# Patient Record
Sex: FEMALE | Race: WHITE
[De-identification: names, ages, dates, MRNs, and addresses within clinical notes are randomized per-mention and may not be internally consistent; named-entity substitution may affect disease eponyms.]

---

## 2017-03-23 ENCOUNTER — HOSPITAL ENCOUNTER (OUTPATIENT)
Dept: HOSPITAL 45 - C.LABMFLN | Age: 62
Discharge: HOME | End: 2017-03-23
Attending: FAMILY MEDICINE
Payer: COMMERCIAL

## 2017-03-23 DIAGNOSIS — E10.8: ICD-10-CM

## 2017-03-23 DIAGNOSIS — E10.649: ICD-10-CM

## 2017-03-23 DIAGNOSIS — R63.4: ICD-10-CM

## 2017-03-23 DIAGNOSIS — E10.65: ICD-10-CM

## 2017-03-23 DIAGNOSIS — E03.9: Primary | ICD-10-CM

## 2017-03-23 DIAGNOSIS — M85.80: ICD-10-CM

## 2017-03-23 DIAGNOSIS — E56.9: ICD-10-CM

## 2017-03-23 LAB
ALBUMIN/GLOB SERPL: 1.4 {RATIO} (ref 0.9–2)
ALP SERPL-CCNC: 109 U/L (ref 45–117)
ALT SERPL-CCNC: 47 U/L (ref 12–78)
ANION GAP SERPL CALC-SCNC: 6 MMOL/L (ref 3–11)
AST SERPL-CCNC: 38 U/L (ref 15–37)
BASOPHILS # BLD: 0.03 K/UL (ref 0–0.2)
BASOPHILS NFR BLD: 0.5 %
BUN SERPL-MCNC: 10 MG/DL (ref 7–18)
BUN/CREAT SERPL: 10.5 (ref 10–20)
CALCIUM SERPL-MCNC: 8.8 MG/DL (ref 8.5–10.1)
CHLORIDE SERPL-SCNC: 100 MMOL/L (ref 98–107)
CHOLEST/HDLC SERPL: 1.9 {RATIO}
CO2 SERPL-SCNC: 34 MMOL/L (ref 21–32)
COMPLETE: YES
CREAT SERPL-MCNC: 0.96 MG/DL (ref 0.6–1.2)
CREAT UR-MCNC: 60 MG/DL
EOSINOPHIL NFR BLD AUTO: 269 K/UL (ref 130–400)
EST. AVERAGE GLUCOSE BLD GHB EST-MCNC: 171 MG/DL
GLOBULIN SER-MCNC: 2.9 GM/DL (ref 2.5–4)
GLUCOSE SERPL-MCNC: 164 MG/DL (ref 70–99)
GLUCOSE UR QL: 103 MG/DL
HCT VFR BLD CALC: 42.4 % (ref 37–47)
IG%: 0.2 %
IMM GRANULOCYTES NFR BLD AUTO: 19.5 %
KETONES UR QL STRIP: 75 MG/DL
LYMPHOCYTES # BLD: 1.21 K/UL (ref 1.2–3.4)
MCH RBC QN AUTO: 34.6 PG (ref 25–34)
MCHC RBC AUTO-ENTMCNC: 34.7 G/DL (ref 32–36)
MCV RBC AUTO: 99.8 FL (ref 80–100)
MONOCYTES NFR BLD: 6.6 %
NEUTROPHILS # BLD AUTO: 2.1 %
NEUTROPHILS NFR BLD AUTO: 71.1 %
NITRITE UR QL STRIP: 73 MG/DL (ref 0–150)
PH UR: 193 MG/DL (ref 0–200)
PMV BLD AUTO: 10 FL (ref 7.4–10.4)
POTASSIUM SERPL-SCNC: 3.8 MMOL/L (ref 3.5–5.1)
RATIO: (no result) MCG/MG (ref 0–30)
RBC # BLD AUTO: 4.25 M/UL (ref 4.2–5.4)
SODIUM SERPL-SCNC: 140 MMOL/L (ref 136–145)
TSH SERPL-ACNC: 1.03 UIU/ML (ref 0.3–4.5)
VERY LOW DENSITY LIPOPROT CALC: 15 MG/DL
WBC # BLD AUTO: 6.21 K/UL (ref 4.8–10.8)

## 2017-03-28 NOTE — CODING QUERY MEDICAL NECESSITY
SUPPORTING DIAGNOSIS NEEDED





A supporting diagnosis is required for the test/procedure performed on this patient in 
order for us to be reimbursed by the patient's insurance. Please provide a supporting 
diagnosis for the following test/procedure listed below next to the test name along with 
your signature. 



*If there is no additional diagnosis for this patient that would support the following 
test/procedure please document that below next to the test/procedure.



Test(s)/Procedure(s) that require a supporting diagnosis:

DOS 3/23

* Vitamin D      DIAGNOSIS:



Provider Signature:  ______________________________  Date:  _______



Thank you  

Melia Cisse

Health Information Management

Phone:  [phone]

Fax:  [phone]



Once completed, please kindly fax back to [phone]



For questions please call [phone]

## 2017-04-12 ENCOUNTER — HOSPITAL ENCOUNTER (OUTPATIENT)
Dept: HOSPITAL 45 - C.LABMFLN | Age: 62
Discharge: HOME | End: 2017-04-12
Attending: FAMILY MEDICINE
Payer: COMMERCIAL

## 2017-04-12 DIAGNOSIS — R63.4: Primary | ICD-10-CM

## 2017-04-12 DIAGNOSIS — R11.0: ICD-10-CM

## 2017-04-12 DIAGNOSIS — F32.9: ICD-10-CM

## 2017-04-12 DIAGNOSIS — R19.7: ICD-10-CM

## 2017-04-12 DIAGNOSIS — G43.909: ICD-10-CM

## 2017-04-12 DIAGNOSIS — R53.83: ICD-10-CM

## 2017-04-12 LAB
ALBUMIN/GLOB SERPL: 1.4 {RATIO} (ref 0.9–2)
ALP SERPL-CCNC: 96 U/L (ref 45–117)
ALT SERPL-CCNC: 24 U/L (ref 12–78)
AMYLASE SERPL-CCNC: 11 U/L (ref 25–115)
ANION GAP SERPL CALC-SCNC: 5 MMOL/L (ref 3–11)
AST SERPL-CCNC: 17 U/L (ref 15–37)
BASOPHILS # BLD: 0.01 K/UL (ref 0–0.2)
BASOPHILS NFR BLD: 0.2 %
BUN SERPL-MCNC: 12 MG/DL (ref 7–18)
BUN/CREAT SERPL: 13.2 (ref 10–20)
CALCIUM SERPL-MCNC: 8.2 MG/DL (ref 8.5–10.1)
CHLORIDE SERPL-SCNC: 104 MMOL/L (ref 98–107)
CO2 SERPL-SCNC: 30 MMOL/L (ref 21–32)
COMPLETE: YES
CREAT SERPL-MCNC: 0.89 MG/DL (ref 0.6–1.2)
EOSINOPHIL NFR BLD AUTO: 184 K/UL (ref 130–400)
GLOBULIN SER-MCNC: 2.5 GM/DL (ref 2.5–4)
GLUCOSE SERPL-MCNC: 162 MG/DL (ref 70–99)
HCT VFR BLD CALC: 38.8 % (ref 37–47)
IG%: 0 %
IMM GRANULOCYTES NFR BLD AUTO: 19.7 %
LYMPHOCYTES # BLD: 0.9 K/UL (ref 1.2–3.4)
MCH RBC QN AUTO: 34.2 PG (ref 25–34)
MCHC RBC AUTO-ENTMCNC: 35.1 G/DL (ref 32–36)
MCV RBC AUTO: 97.5 FL (ref 80–100)
MONOCYTES NFR BLD: 12.7 %
NEUTROPHILS # BLD AUTO: 1.3 %
NEUTROPHILS NFR BLD AUTO: 66.1 %
PMV BLD AUTO: 10.4 FL (ref 7.4–10.4)
POTASSIUM SERPL-SCNC: 3.8 MMOL/L (ref 3.5–5.1)
RBC # BLD AUTO: 3.98 M/UL (ref 4.2–5.4)
SODIUM SERPL-SCNC: 139 MMOL/L (ref 136–145)
TSH SERPL-ACNC: 1.34 UIU/ML (ref 0.3–4.5)
WBC # BLD AUTO: 4.57 K/UL (ref 4.8–10.8)

## 2017-04-14 LAB — O&P GIARDIA AG: NOT DETECTED

## 2017-05-18 ENCOUNTER — HOSPITAL ENCOUNTER (OUTPATIENT)
Dept: HOSPITAL 45 - C.PAPS | Age: 62
Discharge: HOME | End: 2017-05-18
Attending: OBSTETRICS & GYNECOLOGY
Payer: COMMERCIAL

## 2017-05-18 DIAGNOSIS — R87.610: ICD-10-CM

## 2017-05-18 DIAGNOSIS — Z12.4: Primary | ICD-10-CM

## 2017-05-24 LAB
ANION GAP SERPL CALC-SCNC: 5 MMOL/L (ref 3–11)
BASOPHILS # BLD: 0.02 K/UL (ref 0–0.2)
BASOPHILS NFR BLD: 0.2 %
BUN SERPL-MCNC: 12 MG/DL (ref 7–18)
BUN/CREAT SERPL: 11.9 (ref 10–20)
CALCIUM SERPL-MCNC: 8.4 MG/DL (ref 8.5–10.1)
CHLORIDE SERPL-SCNC: 97 MMOL/L (ref 98–107)
CO2 SERPL-SCNC: 34 MMOL/L (ref 21–32)
COMPLETE: YES
CREAT CL PREDICTED SERPL C-G-VRATE: 46.9 ML/MIN
CREAT SERPL-MCNC: 1 MG/DL (ref 0.6–1.2)
EOSINOPHIL NFR BLD AUTO: 246 K/UL (ref 130–400)
GLUCOSE SERPL-MCNC: 296 MG/DL (ref 70–99)
HCT VFR BLD CALC: 38.3 % (ref 37–47)
IG%: 0.1 %
IMM GRANULOCYTES NFR BLD AUTO: 14.7 %
LYMPHOCYTES # BLD: 1.19 K/UL (ref 1.2–3.4)
MCH RBC QN AUTO: 34.3 PG (ref 25–34)
MCHC RBC AUTO-ENTMCNC: 33.4 G/DL (ref 32–36)
MCV RBC AUTO: 102.7 FL (ref 80–100)
MONOCYTES NFR BLD: 6.9 %
NEUTROPHILS # BLD AUTO: 1.2 %
NEUTROPHILS NFR BLD AUTO: 76.9 %
PMV BLD AUTO: 9.6 FL (ref 7.4–10.4)
POTASSIUM SERPL-SCNC: 4.2 MMOL/L (ref 3.5–5.1)
RBC # BLD AUTO: 3.73 M/UL (ref 4.2–5.4)
SODIUM SERPL-SCNC: 136 MMOL/L (ref 136–145)
WBC # BLD AUTO: 8.1 K/UL (ref 4.8–10.8)

## 2017-05-24 NOTE — PAT MEDICATION INSTRUCTIONS
Service Date


May 24, 2017.





Current Home Medication List


Atorvastatin (Lipitor), 10 MG PO QPM


Bupropion (Wellbutrin), 100 MG PO QAM


Celecoxib (CeleBREX), 200 MG PO QAM


Cholecalciferol (Vitamin D3), 5,000 UNITS PO NOON


Diazepam (Valium), 5 MG PO BID PRN for RN


Escitalopram Oxalate (Lexapro), 20 MG PO QAM


Fluticasone Propionate (Nasal) (Flonase Allergy Relief), 2 SPRAYS LIVIA PRN


Insulin Aspart (Novolog), UNITS SC AC


Lamotrigine (Lamictal), 50 MG PO QAM


Lamotrigine (Lamictal), 100 MG PO QPM


Levothyroxine Sodium (Synthroid), 88 MCG PO QAM


Lisinopril (Zestril), 10 MG PO QAM


Loratadine (Claritin), 10 MG PO QPM


Melatonin (Melatonin Maximum Strengt), 1 TAB PO HS


Peppermint Oil (Bulk) (Peppermint Oil), 1 DOSE TOP PRN


Probiotic Product (Probiotic), 1 TAB PO NOON


Sumatriptan Succinate (Imitrex), 100 MG PO PRN


Trazodone Hcl (Trazodone),  MG PO HS PRN for RN


Valacyclovir (Valtrex), 500 MG PO QPM


[Biotin], 1 TAB PO BID


[Diclofenac Gel], 1 DOSE TOP PTN


[M Grain], 1 DOSE TOP PRN





Medication Instructions


For Your Scheduled Surgery 








- Check with surgeon for instructions: 


Celecoxib (CeleBREX), 200 MG PO QAM








- Hold the following medications 24 hours prior to surgery:


[Diclofenac Gel], 1 DOSE TOP PTN


[M Grain], 1 DOSE TOP PRN


Peppermint Oil (Bulk) (Peppermint Oil), 1 DOSE TOP PRN








- Hold the following medications the morning of surgery:


[Biotin], 1 TAB PO BID


Probiotic Product (Probiotic), 1 TAB PO NOON


Lisinopril (Zestril), 10 MG PO QAM


Cholecalciferol (Vitamin D3), 5,000 UNITS PO NOON


Insulin Aspart (Novolog), UNITS SC AC








- Take the following medications the morning of surgery with a sip of water:


Sumatriptan Succinate (Imitrex), 100 MG PO PRN


Levothyroxine Sodium (Synthroid), 88 MCG PO QAM


Lamotrigine (Lamictal), 50 MG PO QAM


Fluticasone Propionate (Nasal) (Flonase Allergy Relief), 2 SPRAYS LIVIA PRN


Escitalopram Oxalate (Lexapro), 20 MG PO QAM


Diazepam (Valium), 5 MG PO BID PRN for RN


Bupropion (Wellbutrin), 100 MG PO QAM








- Take the following medications as scheduled the night before surgery:


[Biotin], 1 TAB PO BID


Valacyclovir (Valtrex), 500 MG PO QPM


Trazodone Hcl (Trazodone),  MG PO HS PRN for RN


Sumatriptan Succinate (Imitrex), 100 MG PO PRN


Loratadine (Claritin), 10 MG PO QPM


Melatonin (Melatonin Maximum Strengt), 1 TAB PO HS


Lamotrigine (Lamictal), 100 MG PO QPM


Fluticasone Propionate (Nasal) (Flonase Allergy Relief), 2 SPRAYS LIVIA PRN


Diazepam (Valium), 5 MG PO BID PRN for RN


Atorvastatin (Lipitor), 10 MG PO QPM





- Continue basal rate of insulin pump after midnight and do not bolus morning 

of surgery.








If you have any questions please call us at [phone] (Michelle Norman PA-C)

 or [phone] or [phone]

## 2017-06-07 ENCOUNTER — HOSPITAL ENCOUNTER (OUTPATIENT)
Dept: HOSPITAL 45 - C.PATHSPEC | Age: 62
Discharge: HOME | End: 2017-06-07
Attending: SURGERY
Payer: COMMERCIAL

## 2017-06-07 DIAGNOSIS — L72.0: Primary | ICD-10-CM

## 2017-06-21 ENCOUNTER — HOSPITAL ENCOUNTER (OUTPATIENT)
Dept: HOSPITAL 45 - C.ACU | Age: 62
Discharge: HOME | End: 2017-06-21
Attending: OTOLARYNGOLOGY
Payer: COMMERCIAL

## 2017-06-21 VITALS
WEIGHT: 112.22 LBS | HEIGHT: 65.98 IN | BODY MASS INDEX: 18.03 KG/M2 | HEIGHT: 65.98 IN | WEIGHT: 112.22 LBS | BODY MASS INDEX: 18.03 KG/M2

## 2017-06-21 VITALS — DIASTOLIC BLOOD PRESSURE: 68 MMHG | HEART RATE: 68 BPM | SYSTOLIC BLOOD PRESSURE: 158 MMHG | OXYGEN SATURATION: 100 %

## 2017-06-21 VITALS
HEART RATE: 60 BPM | OXYGEN SATURATION: 100 % | DIASTOLIC BLOOD PRESSURE: 70 MMHG | SYSTOLIC BLOOD PRESSURE: 139 MMHG | TEMPERATURE: 97.88 F

## 2017-06-21 VITALS — SYSTOLIC BLOOD PRESSURE: 166 MMHG | HEART RATE: 63 BPM | DIASTOLIC BLOOD PRESSURE: 77 MMHG | OXYGEN SATURATION: 100 %

## 2017-06-21 VITALS
HEART RATE: 66 BPM | OXYGEN SATURATION: 99 % | TEMPERATURE: 97.88 F | DIASTOLIC BLOOD PRESSURE: 75 MMHG | SYSTOLIC BLOOD PRESSURE: 158 MMHG

## 2017-06-21 VITALS
DIASTOLIC BLOOD PRESSURE: 81 MMHG | SYSTOLIC BLOOD PRESSURE: 178 MMHG | HEART RATE: 66 BPM | OXYGEN SATURATION: 99 % | TEMPERATURE: 98.06 F

## 2017-06-21 DIAGNOSIS — F32.9: ICD-10-CM

## 2017-06-21 DIAGNOSIS — Z98.890: ICD-10-CM

## 2017-06-21 DIAGNOSIS — E78.5: ICD-10-CM

## 2017-06-21 DIAGNOSIS — E11.9: ICD-10-CM

## 2017-06-21 DIAGNOSIS — Z91.040: ICD-10-CM

## 2017-06-21 DIAGNOSIS — J45.909: ICD-10-CM

## 2017-06-21 DIAGNOSIS — Z88.1: ICD-10-CM

## 2017-06-21 DIAGNOSIS — G47.33: ICD-10-CM

## 2017-06-21 DIAGNOSIS — Z79.899: ICD-10-CM

## 2017-06-21 DIAGNOSIS — J35.01: Primary | ICD-10-CM

## 2017-06-21 DIAGNOSIS — Z90.89: ICD-10-CM

## 2017-06-21 DIAGNOSIS — Z88.2: ICD-10-CM

## 2017-06-21 DIAGNOSIS — I10: ICD-10-CM

## 2017-06-21 NOTE — [facility] OPERATIVE REPORT
Operative Report


Operative Date


Jun 21, 2017.





Pre-Operative Diagnosis





Chronic Tonsillitis





Post-Operative Diagnosis


SAME





Procedure(s) Performed


BILATERAL TONSILLECTOMY





Surgeon


Dr. Roberto Carlos Armstrong





Assistant Surgeon(s)


None per surgeon





Estimated Blood Loss


2ML





Findings


1. 1-2+ CRYPTIC ENDOPHYTIC TONSILS WITH TONSILLITHS





Specimens





A.) Right Tonsil





B.) Left Tonsil


I attest to the content of the Intraoperative Record and any orders documented 

therein.  Any exceptions are noted below.

## 2017-06-21 NOTE — ANESTHESIOLOGY PROGRESS NOTE
Anesthesia Post Op Note


Date & Time


Jun 21, 2017 at 08:42





Vital Signs


Pain Intensity:  0





Vital Signs Past 12 Hours








  Date Time  Temp Pulse Resp B/P (MAP) Pulse Ox O2 Delivery O2 Flow Rate FiO2


 


6/21/17 08:30  65 16 164/81 97 Room Air  





      Oxymask  


 


6/21/17 08:20  70 16 160/83 98 Room Air  





      Oxymask  


 


6/21/17 08:10  72 16 160/85 100 Oxymask 3 


 


6/21/17 08:02 36.3 65 14 166/89 100 Oxymask 10 


 


6/21/17 05:49 36.7 66 16 178/81 (113) 99   











Notes


Mental Status:  alert / awake / arousable, participated in evaluation


Pt Amnestic to Procedure:  Yes


Nausea / Vomiting:  adequately controlled


Pain:  adequately controlled


Airway Patency, RR, SpO2:  stable & adequate


BP & HR:  stable & adequate


Hydration State:  stable & adequate


Anesthetic Complications:  no major complications apparent

## 2017-06-21 NOTE — HISTORY AND PHYSICAL
History & Physical


Date


2017.





Chief Complaint


CHRONIC TONSILLITIS





History of Present Illness


The patient is a 62 year old female with complaints of CHRONIC TONSILLITIS WITH 

SORE THROATS, HALITOSIS, AND TONSILLITH FORMATION.





Past Medical/Surgical History


Medical Problems:


(1) Diabetes


ANDRES, ASTHMA, DEPRESSION, ANXIETY, DYSLIPIDEMIA, HYPOTHYROIDISM, MIGRAINES, OA, 

OSTEOPENIA, RAYNAUD'S, RLS, VITAMIN D DEFICIENCY


PSH:


S/P APPY, , D&C, FINGER SURGERY, HYSTEROSCOPY





Allergies


Coded Allergies:  


     Latex (Verified  Allergy, Mild, RASH, 17)


     Cefaclor (Verified  Allergy, Unknown, RASH HIVES, 17)


     Cephalosporins (Verified  Allergy, Unknown, unknown, 17)


     Ciprofloxacin (Verified  Allergy, Unknown, GI SYMPTOMS, 17)


     Sulfa Antibiotics (Verified  Allergy, Unknown, UNKNOWN, 17)


 patient states that she has tolerated Bactrim in the past


     Amoxicillin (Unverified  Adverse Reaction, Unknown, severe diarrhea, )


     Clavulanic Acid (Unverified  Adverse Reaction, Unknown, severe diarrhea, )





Home Medications


Scheduled


Atorvastatin (Lipitor), 10 MG PO QPM


Bupropion (Wellbutrin), 100 MG PO QAM


Cholecalciferol (Vitamin D3), 5,000 UNITS PO NOON


Escitalopram Oxalate (Lexapro), 20 MG PO QAM


Fluticasone Propionate (Nasal) (Flonase Allergy Relief), 2 SPRAYS LIVIA PRN


Insulin Aspart (Novolog), UNITS SC AC


Lamotrigine (Lamictal), 50 MG PO QAM


Lamotrigine (Lamictal), 100 MG PO QPM


Levothyroxine Sodium (Synthroid), 88 MCG PO QAM


Lisinopril (Zestril), 10 MG PO QAM


Loratadine (Claritin), 10 MG PO QPM


Melatonin (Melatonin Maximum Strengt), 1 TAB PO HS


Peppermint Oil (Bulk) (Peppermint Oil), 1 DOSE TOP PRN


Probiotic Product (Probiotic), 1 TAB PO NOON


Sumatriptan Succinate (Imitrex), 100 MG PO PRN


Valacyclovir (Valtrex), 500 MG PO QPM


[Biotin], 1 TAB PO BID


[M Grain], 1 DOSE TOP PRN





Scheduled PRN


Diazepam (Valium), 5 MG PO BID PRN for RN


Trazodone Hcl (Trazodone),  MG PO HS PRN for RN





Physical Examination


Skin:  warm/dry, no rash


Eyes:  normal inspection, EOMI, sclerae normal


ENT:  + pertinent finding (1-2+ TONSILS WITH TONSILLITH IN L SUPERIOR POLE)


Head:  normocephalic, atraumatic


Neck:  supple, no adenopathy, trachea midline


Respiratory/Chest:  lungs clear, normal breath sounds, no respiratory distress


Cardiovascular:  regular rate, rhythm, no edema, no murmur


Neurologic/Psych:  no motor/sensory deficits, alert, normal reflexes, oriented 

x 3





Diagnosis


CHRONIC TONSILLITIS





Plan of Treatment


BILATERAL TONSILLECTOMY

## 2017-06-21 NOTE — DISCHARGE INSTRUCTIONS
Discharge Instructions


Date of Service


Jun 21, 2017.





Admission


Reason for Admission:  Chronic Tonsillitis





Discharge


Discharge Diagnosis / Problem:  SAME





Discharge Goals


Goal(s):  Therapeutic intervention





Activity Recommendations


Activity Limitations:  as noted below


LIGHT ACTIVITY FOR 2WEEKS; NO DRIVING WHILE ON LORTAB


.





Current Hospital Diet


Patient's current hospital diet:





Discharge Diet


Recommended Diet:  Full Liquid Diet


Diet Texture:  Mechanical Soft (ground)





Pending Studies


Studies pending at discharge:  no





Laboratory Results





Hemoglobin A1c








Test


  3/23/17


11:46 Range/Units


 


 


Estimated Average Glucose 171   mg/dl


 


Hemoglobin A1c 7.6 H 4.5-5.6  %








Lipid Panel








Test


  3/23/17


11:46 Range/Units


 


 


Triglycerides Level 73  0-150  mg/dl


 


Cholesterol Level 193  0-200  mg/dl


 


HDL Cholesterol 103   mg/dl


 


Cholesterol/HDL Ratio 1.9   


 


LDL Cholesterol, Calculated 75   mg/dl











Medical Emergencies








.


Who to Call and When:





Medical Emergencies:  If at any time you feel your situation is an emergency, 

please call 911 immediately.





.





Non-Emergent Contact


Non-Emergency issues call your:  Surgeon





.


.








"Provider Documentation" section prepared by Roberto Carlos Armstrong.








.





VTE Core Measure


Inpt VTE Proph given/why not?:  SCD's

## 2017-06-21 NOTE — OPERATIVE REPORT
DATE OF OPERATION:  06/21/2017

 

PREOPERATIVE DIAGNOSIS:  Chronic tonsillitis.  

 

POSTOPERATIVE DIAGNOSIS:  Chronic tonsillitis.

 

PROCEDURE:  Bilateral tonsillectomy.

 

SURGEON:  Dr. Armstrong.  

 

ASSISTANT:  None.

 

ANESTHESIA:  General endotracheal.

 

ESTIMATED BLOOD LOSS:  2 mL

 

FINDINGS:  Two 1-2+ cryptic endophytic tonsils bilaterally with tonsillith

formation.

 

SPECIMENS:  Left and right tonsil sent separately for permanent pathological

assessment.

 

COMPLICATIONS:  None.

 

INDICATIONS FOR THE PROCEDURE:  The patient is a 62-year-old female with a

history of chronic tonsillitis with recurrent tonsillith formation and

associated sore throats and halitosis.  She presents for the above-mentioned

procedure on an outpatient elective basis.

 

DESCRIPTION OF PROCEDURE:  After informed consent had been obtained from the

patient, the patient was wheeled to the operating room and placed on the

operating table in supine position.  Monitors were placed.  After induction

of general endotracheal anesthesia, the table was turned 90 degrees and the

patient's head and neck were gently extended.  Antibiotic ointment was

applied to lips and the mouth gag was carefully inserted, opened, and

stabilized on a roll of towels.  The palate was inspected and found to be

normal.  An Allis clamp was then used to grasp the right tonsil and the

superior pole and Bovie electrocautery was used to remove the tonsil in a

capsular plane with care to preserve the underlying mucosa and musculature of

the anterior and posterior tonsillar pillars.  The left tonsil was then

removed in a similar fashion.  The intraoperative findings were 1-2+ cryptic

endophytic tonsils bilaterally with tonsillith formation.  The tonsils were

sent separately for permanent pathological assessment.

 

The mouth gag was then released for 1 minute.  This was reopened and

hemostasis was confirmed.  An orogastric tube was placed and the stomach was

suctioned free of air and stomach contents.  2% lidocaine jelly was placed in

the bilateral tonsillar fossae for added anesthetic effect.

 

This marked the end of the case.  The patient tolerated the procedure well

and there were no apparent complications.  The patient was extubated and

transferred to recovery room in stable condition.

 

 

I attest to the content of the Intraoperative Record and any orders documented therein. Any exception
s are noted below.

## 2017-07-10 ENCOUNTER — HOSPITAL ENCOUNTER (OUTPATIENT)
Dept: HOSPITAL 45 - C.LABMFLN | Age: 62
Discharge: HOME | End: 2017-07-10
Attending: FAMILY MEDICINE
Payer: COMMERCIAL

## 2017-07-10 DIAGNOSIS — E10.69: ICD-10-CM

## 2017-07-10 DIAGNOSIS — E10.43: ICD-10-CM

## 2017-07-10 DIAGNOSIS — E03.9: ICD-10-CM

## 2017-07-10 DIAGNOSIS — E10.65: Primary | ICD-10-CM

## 2017-07-10 LAB
ALBUMIN/GLOB SERPL: 1.4 {RATIO} (ref 0.9–2)
ALP SERPL-CCNC: 123 U/L (ref 45–117)
ALT SERPL-CCNC: 53 U/L (ref 12–78)
ANION GAP SERPL CALC-SCNC: 5 MMOL/L (ref 3–11)
AST SERPL-CCNC: 33 U/L (ref 15–37)
BUN SERPL-MCNC: 10 MG/DL (ref 7–18)
BUN/CREAT SERPL: 11.7 (ref 10–20)
CALCIUM SERPL-MCNC: 9 MG/DL (ref 8.5–10.1)
CHLORIDE SERPL-SCNC: 100 MMOL/L (ref 98–107)
CHOLEST/HDLC SERPL: 2 {RATIO}
CO2 SERPL-SCNC: 35 MMOL/L (ref 21–32)
CREAT SERPL-MCNC: 0.84 MG/DL (ref 0.6–1.2)
EST. AVERAGE GLUCOSE BLD GHB EST-MCNC: 166 MG/DL
GLOBULIN SER-MCNC: 2.7 GM/DL (ref 2.5–4)
GLUCOSE SERPL-MCNC: 141 MG/DL (ref 70–99)
GLUCOSE UR QL: 83 MG/DL
KETONES UR QL STRIP: 73 MG/DL
NITRITE UR QL STRIP: 68 MG/DL (ref 0–150)
PH UR: 170 MG/DL (ref 0–200)
POTASSIUM SERPL-SCNC: 3.8 MMOL/L (ref 3.5–5.1)
SODIUM SERPL-SCNC: 140 MMOL/L (ref 136–145)
TSH SERPL-ACNC: 1.48 UIU/ML (ref 0.3–4.5)
VERY LOW DENSITY LIPOPROT CALC: 14 MG/DL

## 2017-09-06 ENCOUNTER — HOSPITAL ENCOUNTER (EMERGENCY)
Dept: HOSPITAL 45 - C.EDB | Age: 62
Discharge: HOME | End: 2017-09-06
Payer: COMMERCIAL

## 2017-09-06 VITALS
HEIGHT: 65.98 IN | HEIGHT: 65.98 IN | BODY MASS INDEX: 17.93 KG/M2 | WEIGHT: 111.55 LBS | WEIGHT: 111.55 LBS | BODY MASS INDEX: 17.93 KG/M2

## 2017-09-06 VITALS — TEMPERATURE: 97.7 F

## 2017-09-06 VITALS — DIASTOLIC BLOOD PRESSURE: 77 MMHG | SYSTOLIC BLOOD PRESSURE: 146 MMHG | OXYGEN SATURATION: 100 % | HEART RATE: 62 BPM

## 2017-09-06 DIAGNOSIS — R53.1: Primary | ICD-10-CM

## 2017-09-06 DIAGNOSIS — E11.9: ICD-10-CM

## 2017-09-06 DIAGNOSIS — F41.9: ICD-10-CM

## 2017-09-06 DIAGNOSIS — Z79.899: ICD-10-CM

## 2017-09-06 DIAGNOSIS — Z91.040: ICD-10-CM

## 2017-09-06 DIAGNOSIS — Z88.1: ICD-10-CM

## 2017-09-06 DIAGNOSIS — Z79.4: ICD-10-CM

## 2017-09-06 DIAGNOSIS — F32.9: ICD-10-CM

## 2017-09-06 DIAGNOSIS — Z88.8: ICD-10-CM

## 2017-09-06 DIAGNOSIS — Z88.2: ICD-10-CM

## 2017-09-06 DIAGNOSIS — E03.9: ICD-10-CM

## 2017-09-06 DIAGNOSIS — I45.10: ICD-10-CM

## 2017-09-06 LAB
ALP SERPL-CCNC: 132 U/L (ref 45–117)
ALT SERPL-CCNC: 43 U/L (ref 12–78)
ANION GAP SERPL CALC-SCNC: 3 MMOL/L (ref 3–11)
APPEARANCE UR: CLEAR
AST SERPL-CCNC: 27 U/L (ref 15–37)
BASOPHILS # BLD: 0.01 K/UL (ref 0–0.2)
BASOPHILS NFR BLD: 0.1 %
BILIRUB UR-MCNC: (no result) MG/DL
BUN SERPL-MCNC: 12 MG/DL (ref 7–18)
BUN/CREAT SERPL: 15.7 (ref 10–20)
CALCIUM SERPL-MCNC: 9.1 MG/DL (ref 8.5–10.1)
CHLORIDE SERPL-SCNC: 98 MMOL/L (ref 98–107)
CO2 SERPL-SCNC: 34 MMOL/L (ref 21–32)
COLOR UR: YELLOW
COMPLETE: YES
CREAT CL PREDICTED SERPL C-G-VRATE: 60.5 ML/MIN
CREAT SERPL-MCNC: 0.77 MG/DL (ref 0.6–1.2)
EOSINOPHIL NFR BLD AUTO: 228 K/UL (ref 130–400)
GLUCOSE SERPL-MCNC: 185 MG/DL (ref 70–99)
HCT VFR BLD CALC: 41.2 % (ref 37–47)
IG%: 0.1 %
IMM GRANULOCYTES NFR BLD AUTO: 17.7 %
LYMPHOCYTES # BLD: 1.23 K/UL (ref 1.2–3.4)
MANUAL MICROSCOPIC REQUIRED?: NO
MCH RBC QN AUTO: 35.1 PG (ref 25–34)
MCHC RBC AUTO-ENTMCNC: 35.2 G/DL (ref 32–36)
MCV RBC AUTO: 99.8 FL (ref 80–100)
MONOCYTES NFR BLD: 5.9 %
NEUTROPHILS # BLD AUTO: 1.4 %
NEUTROPHILS NFR BLD AUTO: 74.8 %
NITRITE UR QL STRIP: (no result)
PH UR STRIP: 8.5 [PH] (ref 4.5–7.5)
PMV BLD AUTO: 9.5 FL (ref 7.4–10.4)
POTASSIUM SERPL-SCNC: 3.8 MMOL/L (ref 3.5–5.1)
RBC # BLD AUTO: 4.13 M/UL (ref 4.2–5.4)
REVIEW REQ?: NO
SODIUM SERPL-SCNC: 135 MMOL/L (ref 136–145)
SP GR UR STRIP: 1.01 (ref 1–1.03)
TSH SERPL-ACNC: 2.54 UIU/ML (ref 0.3–4.5)
URINE BILL WITH OR WITHOUT MIC: (no result)
UROBILINOGEN UR-MCNC: (no result) MG/DL
WBC # BLD AUTO: 6.93 K/UL (ref 4.8–10.8)

## 2017-09-06 NOTE — DIAGNOSTIC IMAGING REPORT
CT SCAN OF THE BRAIN WITHOUT IV CONTRAST



CLINICAL HISTORY: Slurred speech.



COMPARISON STUDY:  No priors.



TECHNIQUE: Unenhanced axial CT scan of the brain is performed from the vertex to

the skull base.



CT DOSE: 537.48 mGy.cm



FINDINGS:



Brain parenchyma: There is mild subcortical and periventricular microangiopathic

change. There is no hemorrhage, mass effect, or evidence of acute territorial

ischemia by CT criteria. Gray-white matter is preserved. No extra-axial fluid

collection is seen.



Ventricles, sulci, cisterns: Normal in configuration.



Intracranial vasculature: There is atherosclerotic calcification of the

cavernous carotid arteries.



Calvarium: Unremarkable.



Sinuses and mastoids: The visualized paranasal sinuses are clear. The mastoid

air cells are well pneumatized.



Orbits: The bony orbits are grossly intact.





IMPRESSION: There is no hemorrhage, mass effect, or evidence of acute

territorial ischemia by CT criteria.







Electronically signed by:  Elijah Rodríguez M.D.

9/6/2017 6:31 PM



Dictated Date/Time:  9/6/2017 6:29 PM

## 2017-09-06 NOTE — EMERGENCY ROOM VISIT NOTE
History


Report prepared by Alejandro:  Heather Khan


Under the Supervision of:  Dr. Kwasi Doshi M.D.


First contact with patient:  16:59


Chief Complaint:  STROKE SYMPTOMS


Stated Complaint:  STUMBLING, WEAKNESS, TROUBLE/SLURRED SPEAKING





History of Present Illness


The patient is a 62 year old female who presents to the Emergency Room with 

complaints of constant generalized weakness beginning about a month ago. The 

patient notes she has been having trouble speaking, putting words together, 

slurring of her words, trouble thinking and remember things, stumbling over feet

, dizziness after leaning over to  things, loss of strength in her hands

, shakiness in hands, racing heart beat, and constant chills.  She denies any 

falls, vision changes, problems swallowing, fever, chest pain, shortness of 

breath, nausea, or vomiting. The patient also notes numbness in her left left 

arm that extends to her fingers that may be due to an injury she had with her 

left shoulder a couple months ago. The patient has a history of vertigo, 

diabetes, depression and anxiety. She states her diabetes is well controlled 

and that she was recently put on medication for her anxiety and depression. The 

patient's PCP is Dr. Elijah Salcido who advised her to come to the ED today. The 

patient saw Dr. Deshpande -Psychiatry today about her new depression/anxiety 

medication and her recent trouble maintaining her weight. She has a history of 

hypothyroidism and a tonsillectomy.





   Source of History:  patient, family


   Onset:  a month ago


   Position:  other (generalized)


   Quality:  other (weakness)


   Associated Symptoms:  No fevers, No chest pain, No SOB, No nausea, No 

vomiting


Note:


The patient notes trouble speaking, putting words together, slurring of her 

words, trouble thinking and remember things, stumbling over feet, dizziness 

after leaning over to  things, loss of strength in her hands, shakiness 

in hands, some racing heart beats, and constant chills. Pt  denies any falls, 

vision changes, or problems swallowing.





Review of Systems


See HPI for pertinent positives & negatives. A total of 10 systems reviewed and 

were otherwise negative.





Past Medical & Surgical


Medical Problems:


(1) Anxiety


(2) Depression


(3) Diabetes


(4) Hypothyroid


(5) Vertigo





Old medical records were reviewed. Nurse's notes were reviewed and I agree with.





Family History





Diabetes mellitus





Social History


Smoking Status:  Never Smoker


Smokeless Tobacco Use:  No


Alcohol Use:  occasionally


Drug Use:  none


Housing Status:  lives with family


Occupation Status:  employed





Current/Historical Medications


Scheduled


Atorvastatin (Lipitor), 10 MG PO QPM


Biotin (Biotin), 1 TAB PO BID


Bupropion (Wellbutrin), 100 MG PO UD


Cholecalciferol (Vitamin D3), 5,000 UNITS PO NOON


Diazepam (Valium), 5 MG PO HS


Escitalopram Oxalate (Lexapro), 20 MG PO HS


Fluticasone Propionate (Nasal) (Flonase Allergy Relief), 2 SPRAYS LIVIA PRN


Insulin Aspart (Novolog), SQ AC


Lamotrigine (Lamictal), 50 MG PO BID


Levothyroxine Sodium (Synthroid), 88 MCG PO QAM


Lisinopril (Zestril), 10 MG PO HS


Loratadine (Claritin), 10 MG PO QPM


Melatonin (Melatonin Maximum Strengt), 1 TAB PO HS


Peppermint Oil (Bulk) (Peppermint Oil), 1 DOSE TOP PRN


Sumatriptan Succinate (Imitrex), 100 MG PO PRN


Valacyclovir (Valtrex), 500 MG PO QPM


[M Grain], 1 DOSE TOP PRN





Scheduled PRN


Trazodone Hcl (Trazodone),  MG PO HS PRN for RN





Allergies


Coded Allergies:  


     Latex (Verified  Allergy, Mild, RASH, 9/6/17)


     Cefaclor (Verified  Allergy, Unknown, RASH HIVES, 9/6/17)


     Cephalosporins (Verified  Allergy, Unknown, unknown, 9/6/17)


     Sulfa Antibiotics (Verified  Allergy, Unknown, UNKNOWN, 9/6/17)


 patient states that she has tolerated Bactrim in the past


     Amoxicillin (Verified  Adverse Reaction, Unknown, severe diarrhea, 9/6/17)


     Ciprofloxacin (Verified  Adverse Reaction, Unknown, GI SYMPTOMS, 9/6/17)


     Clavulanic Acid (Verified  Adverse Reaction, Unknown, severe diarrhea, 9/6/ 17)





Physical Exam


Vital Signs











  Date Time  Temp Pulse Resp B/P (MAP) Pulse Ox O2 Delivery O2 Flow Rate FiO2


 


9/6/17 20:34  62 18 146/77 100   


 


9/6/17 20:21  66 18  99   


 


9/6/17 20:06  66 17  99   


 


9/6/17 20:01    147/78    


 


9/6/17 19:51  71 18  98   


 


9/6/17 19:36  69 17  98   


 


9/6/17 19:31    151/83    


 


9/6/17 19:06  68 15  100   


 


9/6/17 19:01    164/84    


 


9/6/17 19:00  68 18  100   


 


9/6/17 18:49  62 16 167/91 100 Room Air  


 


9/6/17 18:46        


 


9/6/17 18:45  70 17  100   


 


9/6/17 17:48  66      


 


9/6/17 16:53 36.5 72 18 161/87 99 Room Air  











Physical Exam


General: Slender middle aged female in no acute distress, breathing comfortably 

on room air. Normal speech


HEENT: Normal cephalic atraumatic.  Pupils are equal round and reactive to 

light.  Extraocular movements are intact.  Oropharynx is pink with moist mucous 

membranes.  No swelling of the mouth lips or tongue.  Raymon Coma Score 15.  

Normal speech.  No aphasia.  Answering questions appropriately with normal 

thought process.


Neck: Supple with a midline trachea.  No meningeal signs or stiffness, no JVD 

or bruits. No Stridor.


Chest: Clear to auscultation bilaterally.  No wheezes or rhonchi.  No increased 

work of breathing.


Heart: regular rate and rhythm. 


Abdomen: Soft nontender, nondistended without rebound guarding or rigidity.  


Extremities: No cyanosis clubbing or edema. No calf tenderness or assymetry


Spine/Back. Non tender to palpation. No CVA tenderness


Skin: Good turgor without rashes.


Neurologic exam: Cranial nerves two through 12 are intact.  Motor and sensation 

are intact and symmetrical throughout. No tremor, finger to nose intact, able 

to read without difficulty.





Medical Decision & Procedures


ER Provider


Diagnostic Interpretation:


Radiology results as stated below per my review and radiologist interpretation:





CHEST ONE VIEW PORTABLE





FINDINGS: Lung volumes are at the upper limits of normal. Nipple shadows project


over each lung. There is no consolidation or evidence of pulmonary edema.


Cardiomediastinal silhouette is normal. There is no pneumothorax or pleural


effusion. 





IMPRESSION:  No acute cardiopulmonary findings. 





Electronically signed by:  Chucho Sutton M.D.





CT SCAN OF THE BRAIN WITHOUT IV CONTRAST





FINDINGS:





Brain parenchyma: There is mild subcortical and periventricular microangiopathic


change. There is no hemorrhage, mass effect, or evidence of acute territorial


ischemia by CT criteria. Gray-white matter is preserved. No extra-axial fluid


collection is seen.





Ventricles, sulci, cisterns: Normal in configuration.





Intracranial vasculature: There is atherosclerotic calcification of the


cavernous carotid arteries.





Calvarium: Unremarkable.





Sinuses and mastoids: The visualized paranasal sinuses are clear. The mastoid


air cells are well pneumatized.





Orbits: The bony orbits are grossly intact.








IMPRESSION: There is no hemorrhage, mass effect, or evidence of acute


territorial ischemia by CT criteria.





Electronically signed by:  Elijah Rodríguez M.D.





Laboratory Results


9/6/17 18:10








Red Blood Count 4.13, Mean Corpuscular Volume 99.8, Mean Corpuscular Hemoglobin 

35.1, Mean Corpuscular Hemoglobin Concent 35.2, Mean Platelet Volume 9.5, 

Neutrophils (%) (Auto) 74.8, Lymphocytes (%) (Auto) 17.7, Monocytes (%) (Auto) 

5.9, Eosinophils (%) (Auto) 1.4, Basophils (%) (Auto) 0.1, Neutrophils # (Auto) 

5.17, Lymphocytes # (Auto) 1.23, Monocytes # (Auto) 0.41, Eosinophils # (Auto) 

0.10, Basophils # (Auto) 0.01





9/6/17 18:10

















Test


  9/6/17


18:10 9/6/17


18:51


 


White Blood Count


  6.93 K/uL


(4.8-10.8) 


 


 


Red Blood Count


  4.13 M/uL


(4.2-5.4) 


 


 


Hemoglobin


  14.5 g/dL


(12.0-16.0) 


 


 


Hematocrit 41.2 % (37-47)  


 


Mean Corpuscular Volume


  99.8 fL


() 


 


 


Mean Corpuscular Hemoglobin


  35.1 pg


(25-34) 


 


 


Mean Corpuscular Hemoglobin


Concent 35.2 g/dl


(32-36) 


 


 


Platelet Count


  228 K/uL


(130-400) 


 


 


Mean Platelet Volume


  9.5 fL


(7.4-10.4) 


 


 


Neutrophils (%) (Auto) 74.8 %  


 


Lymphocytes (%) (Auto) 17.7 %  


 


Monocytes (%) (Auto) 5.9 %  


 


Eosinophils (%) (Auto) 1.4 %  


 


Basophils (%) (Auto) 0.1 %  


 


Neutrophils # (Auto)


  5.17 K/uL


(1.4-6.5) 


 


 


Lymphocytes # (Auto)


  1.23 K/uL


(1.2-3.4) 


 


 


Monocytes # (Auto)


  0.41 K/uL


(0.11-0.59) 


 


 


Eosinophils # (Auto)


  0.10 K/uL


(0-0.5) 


 


 


Basophils # (Auto)


  0.01 K/uL


(0-0.2) 


 


 


RDW Standard Deviation


  41.4 fL


(36.4-46.3) 


 


 


RDW Coefficient of Variation


  11.4 %


(11.5-14.5) 


 


 


Immature Granulocyte % (Auto) 0.1 %  


 


Immature Granulocyte # (Auto)


  0.01 K/uL


(0.00-0.02) 


 


 


Anion Gap


  3.0 mmol/L


(3-11) 


 


 


Est Creatinine Clear Calc


Drug Dose 60.5 ml/min 


  


 


 


Estimated GFR (


American) 95.9 


  


 


 


Estimated GFR (Non-


American 82.8 


  


 


 


BUN/Creatinine Ratio 15.7 (10-20)  


 


Calcium Level


  9.1 mg/dl


(8.5-10.1) 


 


 


Total Bilirubin


  0.5 mg/dl


(0.2-1) 


 


 


Direct Bilirubin


  0.2 mg/dl


(0-0.2) 


 


 


Aspartate Amino Transf


(AST/SGOT) 27 U/L (15-37) 


  


 


 


Alanine Aminotransferase


(ALT/SGPT) 43 U/L (12-78) 


  


 


 


Alkaline Phosphatase


  132 U/L


() 


 


 


Total Protein


  6.8 gm/dl


(6.4-8.2) 


 


 


Albumin


  4.2 gm/dl


(3.4-5.0) 


 


 


Lipase


  77 U/L


() 


 


 


Thyroid Stimulating Hormone


(TSH) 2.540 uIu/ml


(0.300-4.500) 


 


 


Urine Color  YELLOW 


 


Urine Appearance  CLEAR (CLEAR) 


 


Urine pH  8.5 (4.5-7.5) 


 


Urine Specific Gravity


  


  1.010


(1.000-1.030)


 


Urine Protein  NEG (NEG) 


 


Urine Glucose (UA)  NEG (NEG) 


 


Urine Ketones  NEG (NEG) 


 


Urine Occult Blood  NEG (NEG) 


 


Urine Nitrite  NEG (NEG) 


 


Urine Bilirubin  NEG (NEG) 


 


Urine Urobilinogen  NEG (NEG) 


 


Urine Leukocyte Esterase  NEG (NEG) 





Laboratory studies as stated above per my review.





Medications Administered











 Medications


  (Trade)  Dose


 Ordered  Sig/Heather


 Route  Start Time


 Stop Time Status Last Admin


Dose Admin


 


 Sodium Chloride  250 ml @ 


 999 mls/hr  Q16M STAT


 IV  9/6/17 17:13


 9/6/17 17:28 DC 9/6/17 18:17


999 MLS/HR


 


 Sodium Chloride  1,000 ml @ 


 100 mls/hr  Q10H STAT


 IV  9/6/17 17:13


 9/6/17 22:18 DC 9/6/17 18:17


100 MLS/HR











ECG


Indication:  weakness


Rate (beats per minute):  63


Rhythm:  normal sinus


Findings:  RBBB (incomplete), no ectopy, other (poor R-wave progression)


Comparison ECG Date:  no prior available





ED Course


1702: Past medical records reviewed. The patient was evaluated in room A9B, and 

a complete history and physical examination were performed.





1713: Sodium Chloride 1000 ml @ 100 mls/hr IV, Sodium Chloride 250 ml @ 999 mls/

hr IV.





1830: The patient is headed to CT. She is comfortable appearing.





1925: Upon reevaluation, the patient is resting comfortably. I discussed the 

results and treatment plan with her. She verbalized agreement of the treatment 

plan. The patient was discharged home.





Medical Decision


Differential diagnosis include but are not limited to: intracranial process, 

medication side effect, electrolyte metabolic abnormality, infection, 

toxicologic process.





This patient comes in as described above.  She was placed in room A9.  She 

complains about having sensation that it's hard to find words and feeling a 

little shaky over the last month.  There is nothing different about today.  

There is nothing to suggest that she had an acute stroke at present as the 

symptoms have gone on for a while and she has a normal exam and normal stroke 

scale.  She was able to read sentences without problems or slurred speech.  She 

has no nystagmus.  She does not drink alcohol.  She is on multiple psychiatric 

medications that possibly be causing some of her symptoms.  She has no clonus 

or anything to suggest serotonin syndrome.  EKG does not suggest acute coronary 

syndrome or arrhythmia .CAT scan of her head is unremarkable.  She's had no 

acute electrolyte or metabolic abnormalities.  She's 10 nothing shows trauma or 

infection.  She feels good and would like to go home.  She is ambulating 

without difficulty.  I will have her follow-up with her regular doctor and she 

may need further outpatient workup including possibly an MRI.  She can use and 

aspirin day and return if: Worsening of symptoms, any new problems or concerns.

  She is happy the plan and discharged to home.





Medication Reconcilliation


Current Medication List:  was personally reviewed by me





Blood Pressure Screening


Patient's blood pressure:  Elevated blood pressure


Blood pressure disposition:  Elevated BP felt to be situational





Impression





 Primary Impression:  


 Weakness





Scribe Attestation


The scribe's documentation has been prepared under my direction and personally 

reviewed by me in its entirety. I confirm that the note above accurately 

reflects all work, treatment, procedures, and medical decision making performed 

by me.





Departure Information


Dispostion


Home / Self-Care





Referrals


Elijah Salcido M.D. (PCP)





Forms


HOME CARE DOCUMENTATION FORM,                                                 

               IMPORTANT VISIT INFORMATION





Patient Instructions


My [facility]





Additional Instructions





Rest.


Drink plenty of fluids.





Return if: Fever or chills, worsening of symptoms, numbness of weakness, any 

new problems or concerns





Follow-up with your doctor this week for recheck

## 2017-09-07 ENCOUNTER — HOSPITAL ENCOUNTER (OUTPATIENT)
Dept: HOSPITAL 45 - C.LABMFLN | Age: 62
Discharge: HOME | End: 2017-09-07
Attending: FAMILY MEDICINE
Payer: COMMERCIAL

## 2017-09-07 DIAGNOSIS — E55.9: ICD-10-CM

## 2017-09-07 DIAGNOSIS — R47.01: Primary | ICD-10-CM

## 2017-09-07 DIAGNOSIS — G62.9: ICD-10-CM

## 2017-09-27 NOTE — CODING QUERY MEDICAL NECESSITY
CQSUPPORTING DIAGNOSIS NEEDED





A supporting diagnosis is required for the test/procedure performed on this patient in 
order for us to be reimbursed by the patient's insurance. Please provide a supporting 
diagnosis for the following test/procedure listed below next to the test name along with 
your signature. 



*If there is no additional diagnosis for this patient that would support the following 
test/procedure please document that below next to the test/procedure.



Test(s)/Procedure(s) that require a supporting diagnosis:





DOS     09/07/17     VITAMIN B12 TEST                  FOLIC ACID TEST





Provider Signature:  ______________________________  Date:  _______



Thank you  

Delisa Oconnor

Health Information Management

Phone:  [phone]

Fax:  [phone]



Once completed, please kindly fax back to [phone]



For questions please call [phone]

## 2017-10-10 ENCOUNTER — HOSPITAL ENCOUNTER (OUTPATIENT)
Dept: HOSPITAL 45 - C.LABMFLN | Age: 62
Discharge: HOME | End: 2017-10-10
Attending: FAMILY MEDICINE
Payer: COMMERCIAL

## 2017-10-10 DIAGNOSIS — E03.9: ICD-10-CM

## 2017-10-10 DIAGNOSIS — E10.9: Primary | ICD-10-CM

## 2017-10-10 LAB
ANION GAP SERPL CALC-SCNC: 4 MMOL/L (ref 3–11)
BUN SERPL-MCNC: 9 MG/DL (ref 7–18)
BUN/CREAT SERPL: 11.2 (ref 10–20)
CALCIUM SERPL-MCNC: 9 MG/DL (ref 8.5–10.1)
CHLORIDE SERPL-SCNC: 99 MMOL/L (ref 98–107)
CO2 SERPL-SCNC: 35 MMOL/L (ref 21–32)
CREAT SERPL-MCNC: 0.83 MG/DL (ref 0.6–1.2)
EST. AVERAGE GLUCOSE BLD GHB EST-MCNC: 166 MG/DL
GLUCOSE SERPL-MCNC: 146 MG/DL (ref 70–99)
POTASSIUM SERPL-SCNC: 3.3 MMOL/L (ref 3.5–5.1)
SODIUM SERPL-SCNC: 138 MMOL/L (ref 136–145)

## 2017-10-23 ENCOUNTER — HOSPITAL ENCOUNTER (OUTPATIENT)
Dept: HOSPITAL 45 - C.MAMM | Age: 62
Discharge: HOME | End: 2017-10-23
Attending: OBSTETRICS & GYNECOLOGY
Payer: COMMERCIAL

## 2017-10-23 DIAGNOSIS — Z12.31: Primary | ICD-10-CM

## 2017-10-24 NOTE — MAMMOGRAPHY REPORT
BILATERAL DIGITAL SCREENING MAMMOGRAM TOMOSYNTHESIS WITH CAD: 10/23/2017

CLINICAL HISTORY: Routine screening.  Patient has no complaints.  





TECHNIQUE:  Breast tomosynthesis in addition to standard 2D mammography was performed. Current study 
was also evaluated with a Computer Aided Detection (CAD) system.  



COMPARISON: Comparison is made to exams dated:  10/20/2016 mammogram, 9/25/2014 mammogram, 9/24/2013 
mammogram, 5/10/2011 ultrasound, 5/10/2011 mammogram, and 4/28/2011 mammogram - [facility].   



BREAST COMPOSITION:  The tissue of both breasts is heterogeneously dense, which may obscure small mas
ses.  



FINDINGS:  There are mild vascular calcifications in the breasts.  No suspicious mass, architectural 
distortion or cluster of suspicious microcalcifications is seen.  



IMPRESSION:  ACR BI-RADS CATEGORY 1: NEGATIVE

There is no mammographic evidence of malignancy. A 1 year screening mammogram is recommended.  The pa
tient will receive written notification of the results.  





Approximately 10% of breast cancers are not detected with mammography. A negative mammographic report
 should not delay biopsy if a clinically suggestive mass is present.



Debbi Wilkerson M.D.          

ay/:10/23/2017 15:53:31  



Imaging Technologist: Crista RAND(ALEXANDRO)(M), [facility]

letter sent: Normal 1/2  

BI-RADS Code: ACR BI-RADS Category 1: Negative

## 2017-12-05 ENCOUNTER — HOSPITAL ENCOUNTER (OUTPATIENT)
Dept: HOSPITAL 45 - C.PAPS | Age: 62
Discharge: HOME | End: 2017-12-05
Attending: OBSTETRICS & GYNECOLOGY
Payer: COMMERCIAL

## 2017-12-05 DIAGNOSIS — R87.612: Primary | ICD-10-CM

## 2018-01-25 ENCOUNTER — HOSPITAL ENCOUNTER (OUTPATIENT)
Dept: HOSPITAL 45 - C.LABMFLN | Age: 63
Discharge: HOME | End: 2018-01-25
Attending: FAMILY MEDICINE
Payer: COMMERCIAL

## 2018-01-25 DIAGNOSIS — F41.8: ICD-10-CM

## 2018-01-25 DIAGNOSIS — E03.9: Primary | ICD-10-CM

## 2018-01-25 DIAGNOSIS — E78.5: ICD-10-CM

## 2018-01-25 LAB
BUN SERPL-MCNC: 14 MG/DL (ref 7–18)
CALCIUM SERPL-MCNC: 9.1 MG/DL (ref 8.5–10.1)
CO2 SERPL-SCNC: 35 MMOL/L (ref 21–32)
CREAT SERPL-MCNC: 0.84 MG/DL (ref 0.6–1.2)
GLUCOSE SERPL-MCNC: 199 MG/DL (ref 70–99)
POTASSIUM SERPL-SCNC: 4 MMOL/L (ref 3.5–5.1)
SODIUM SERPL-SCNC: 133 MMOL/L (ref 136–145)

## 2018-01-26 LAB — HBA1C MFR BLD: 7.3 % (ref 4.5–5.6)

## 2018-02-15 ENCOUNTER — HOSPITAL ENCOUNTER (OUTPATIENT)
Dept: HOSPITAL 45 - C.PATHSPEC | Age: 63
Discharge: HOME | End: 2018-02-15
Attending: OBSTETRICS & GYNECOLOGY
Payer: COMMERCIAL

## 2018-02-15 DIAGNOSIS — R87.610: Primary | ICD-10-CM

## 2018-02-15 DIAGNOSIS — R87.810: ICD-10-CM

## 2018-04-11 ENCOUNTER — HOSPITAL ENCOUNTER (OUTPATIENT)
Dept: HOSPITAL 45 - X.SURG | Age: 63
Discharge: HOME | End: 2018-04-11
Attending: OBSTETRICS & GYNECOLOGY
Payer: COMMERCIAL

## 2018-04-11 VITALS
HEIGHT: 65.98 IN | BODY MASS INDEX: 18.84 KG/M2 | WEIGHT: 117.24 LBS | WEIGHT: 117.24 LBS | BODY MASS INDEX: 18.84 KG/M2 | HEIGHT: 65.98 IN

## 2018-04-11 VITALS — DIASTOLIC BLOOD PRESSURE: 78 MMHG | HEART RATE: 55 BPM | SYSTOLIC BLOOD PRESSURE: 149 MMHG | OXYGEN SATURATION: 99 %

## 2018-04-11 VITALS — TEMPERATURE: 97.88 F

## 2018-04-11 DIAGNOSIS — N87.0: Primary | ICD-10-CM

## 2018-04-11 DIAGNOSIS — E10.43: ICD-10-CM

## 2018-04-11 DIAGNOSIS — Z79.899: ICD-10-CM

## 2018-04-11 DIAGNOSIS — Z88.1: ICD-10-CM

## 2018-04-11 DIAGNOSIS — F41.8: ICD-10-CM

## 2018-04-11 DIAGNOSIS — Z82.49: ICD-10-CM

## 2018-04-11 DIAGNOSIS — G25.81: ICD-10-CM

## 2018-04-11 DIAGNOSIS — J45.909: ICD-10-CM

## 2018-04-11 DIAGNOSIS — Z79.4: ICD-10-CM

## 2018-04-11 DIAGNOSIS — E10.51: ICD-10-CM

## 2018-04-11 DIAGNOSIS — N72: ICD-10-CM

## 2018-04-11 DIAGNOSIS — Z88.2: ICD-10-CM

## 2018-04-11 DIAGNOSIS — I10: ICD-10-CM

## 2018-04-11 DIAGNOSIS — R87.810: ICD-10-CM

## 2018-04-11 DIAGNOSIS — M54.12: ICD-10-CM

## 2018-04-11 DIAGNOSIS — Z88.8: ICD-10-CM

## 2018-04-11 DIAGNOSIS — E78.5: ICD-10-CM

## 2018-04-11 DIAGNOSIS — Z96.41: ICD-10-CM

## 2018-04-11 DIAGNOSIS — G47.33: ICD-10-CM

## 2018-04-11 DIAGNOSIS — E55.9: ICD-10-CM

## 2018-04-11 DIAGNOSIS — E10.3599: ICD-10-CM

## 2018-04-11 NOTE — OPERATIVE REPORT
DATE OF OPERATION:  04/11/2018

 

PREOPERATIVE DIAGNOSES:

1.  Persistent SALIMA 1.

2.  Persistent high risk HPV virus infection.

3.  Inadequate colposcopy.

 

POSTOPERATIVE DIAGNOSES:

1.  Persistent SALIMA 1.

2.  Persistent high risk HPV virus infection.

3.  Inadequate colposcopy.

 

PROCEDURE:  LEEP.

 

SURGEON:  Telma Moe MD

 

ANESTHESIA:  Sedation and mask anesthesia.

 

ESTIMATED BLOOD LOSS:  None.

 

FLUIDS:  500 mL.

 

URINE OUTPUT:  None.

 

INDICATIONS:  Destiny is a 63-year-old white female with persistent low-grade

abnormalities of the cervix and high risk HPV infection since 2016.  She

desires definitive surgical management.

 

FINDINGS:  Vagina appears normal.  Cervix appears nulliparous with cervical

stenosis.  There is still some Monsel solution noted on the cervix.

 

COMPLICATIONS:  None.

 

DRAINS:  None.

 

DISPOSITION:  To recovery room in stable condition.

 

PROCEDURE:  The patient was taken to the operating room where she was

identified verbally and by bracelet.  She was sedated and placed in the

candy-cane stirrups.  Timeout was held identifying correct patient,

procedure, positioning, allergies, and no need for preoperative antibiotic. 

The patient was draped with 2 moistened towels.  A coated-speculum was placed

into the vagina with suction hooked to this.  Vinegar was placed on the

cervix and using a loop, LEEP procedure was performed.  Bleeding edges were

attended to with Bovie electrocautery.  All external edges were attended to

with Bovie electrocautery.  Astringent was placed on to the cervix and the

procedure was terminated.  All instruments were removed from vagina.  All

sponge, lap, and needle counts were correct x2.  The patient tolerated the

procedure well and was taken to recovery room in stable condition.

 

 

I attest to the content of the Intraoperative Record and any orders documented therein. Any exception
s are noted below.

## 2018-04-11 NOTE — [facility] POST OPERATIVE BRIEF NOTE
Immediate Operative Summary


Operative Date


Apr 11, 2018.





Pre-Operative Diagnosis





Persistent Low Grade cervical biopsy and Persistent High Grade HPV


infection





Post-Operative Diagnosis





same





Procedure(s) Performed





Loop Electrosurgical Excision Procedure





Surgeon


Dr. ALIYAH Moe





Assistant Surgeon(s)


0





Estimated Blood Loss


0





Findings


Consistent with Post-Op Diagnosis





Specimens





A. LEEP Specimen





Drains


None





Anesthesia Type


MAC





Complication(s)


none





Disposition


Accompanied Pt To Recovery:  no


Disposition:  Recovery Room / PACU

## 2018-04-11 NOTE — DISCHARGE INSTRUCTIONS
Discharge Instructions


Date of Service


Apr 11, 2018.





Visit


Reason for Visit:  Cervical High Rish Hpv Test Positive





Discharge


Discharge Diagnosis / Problem:  s/p LEEP





Discharge Goals


Goal(s):  Specific goals





Activity Recommendations


Activity Limitations:  per Instructions/Follow-up section





Anesthesia


.





Post Anesthesia Instructions:





If you have had General Anesthesia or IV Sedation:





*  Do not drive today.


*  Resume driving when surgeon permits.


*  Do not make important decisions or sign legal documents today.


*  Call surgeon for:





   1.  Temperature elevations greater than 101 degrees F.


   2.  Uncontrollable pain.


   3.  Excessive bleeding.


   4.  Persistent nausea and vomiting.


   5.  Medication intolerance (nausea, vomiting or rash).





*  For nausea and vomiting use only clear liquids such as: tea, soda, bouillon 

until nausea subsides, then gradually increase diet as tolerated.





*  If you have any concerns or questions, call your surgeon's office.  If 

physician is unavailable and it is an emergency, call 911 or go to the nearest 

emergency room.





.





Instructions / Follow-Up


Instructions / Follow-Up


ACTIVITY RECOMMENDATIONS:





*  Avoid tampons, douching, hot tubs, pools, and intercourse for 4 weeks.


*  May shower as usual.


*  No strenuous activity for 24-48 hours.  After 24-48 hours, you may do 

anything you feel


    like doing (driving and sports are okay).








SPECIAL CARE INSTRUCTIONS:





Special Diet:





*  Mild nausea may occur in the immediate post-operative period.  


*  Take clear liquids such as tea, cola or bouillon until all nausea has 

subsided; you may


    then resume your normal diet.





Special Care:





*  Light bleeding and vaginal spotting can last from a few days to 3-4 weeks.


   Call your doctor if bleeding becomes heavier than the heaviest part of your 

period.


*  Check your temperature twice a day for one week.  If it goes above 100.4 

degrees Fahrenheit


   (38.0 Celsius), notify your doctor.





*  Call your doctor's office for an appointment for 4 weeks after your surgery. 








FOLLOW-UP VISIT:





Call your doctor's office for an appointment for 4 weeks after your surgery.





Diet Recommendations


Recommended Home Diet:  no limitations, resume previous diet





Procedures


Procedures Performed:  


Loop Electrosurgical Excision Procedure





Pending Studies


Studies pending at discharge:  no





Medical Emergencies








.


Who to Call and When:





Medical Emergencies:  If at any time you feel your situation is an emergency, 

please call 911 immediately.





.





Non-Emergent Contact


Non-Emergency issues call your:  Gynecologist





.


.








"Provider Documentation" section prepared by Telma Moe.








.

## 2018-04-11 NOTE — ANESTHESIA PROGRESS NT - [facility]
Anesthesia Post Op Note


Date & Time


Apr 11, 2018 at 13:22





Vital Signs


Pain Intensity:  0





Vital Signs Past 12 Hours








  Date Time  Temp Pulse Resp B/P (MAP) Pulse Ox O2 Delivery O2 Flow Rate FiO2


 


4/11/18 13:03 36.6 54 16 146/78 (100) 98 Room Air  


 


4/11/18 12:56  59 8  98   


 


4/11/18 12:56  59 8     


 


4/11/18 12:55    124/67    


 


4/11/18 12:51  60 13     


 


4/11/18 12:51  59 13  99   


 


4/11/18 12:50    124/66    


 


4/11/18 12:49 36.8 59 12 121/66 100 Diffusion Mask 6 


 


4/11/18 12:46  62 9     


 


4/11/18 12:46  62 9  100   


 


4/11/18 12:45    121/66    


 


4/11/18 12:42  61 7  100   


 


4/11/18 12:42  62 7     


 


4/11/18 12:40    122/68    


 


4/11/18 12:37  63 9     


 


4/11/18 12:37  63 9  100   


 


4/11/18 12:36  65 12     


 


4/11/18 12:36  66 12  100   


 


4/11/18 12:35    113/60    


 


4/11/18 12:31  65 15  100   


 


4/11/18 12:31  65 15     


 


4/11/18 12:30    107/57    


 


4/11/18 12:26  69 12  99   


 


4/11/18 12:26  69 12     


 


4/11/18 12:25    99/58    


 


4/11/18 12:21  65 12     


 


4/11/18 12:21  64 12  100   


 


4/11/18 12:20    88/49    


 


4/11/18 12:19    103/53    


 


4/11/18 12:17    89/55    


 


4/11/18 12:16  72      


 


4/11/18 12:16 36.7 67 8 89/55 99 Diffusion Mask 10 


 


4/11/18 12:16  72   87   


 


4/11/18 10:57 36.5 61 20 117/71 (86) 97 Room Air  











Notes


Mental Status:  alert / awake / arousable, participated in evaluation


Pt Amnestic to Procedure:  Yes


Nausea / Vomiting:  adequately controlled


Pain:  adequately controlled


Airway Patency, RR, SpO2:  stable & adequate


BP & HR:  stable & adequate


Hydration State:  stable & adequate


Anesthetic Complications:  no major complications apparent

## 2018-04-30 ENCOUNTER — HOSPITAL ENCOUNTER (OUTPATIENT)
Dept: HOSPITAL 45 - C.LABMFLN | Age: 63
Discharge: HOME | End: 2018-04-30
Attending: FAMILY MEDICINE
Payer: COMMERCIAL

## 2018-04-30 DIAGNOSIS — R42: ICD-10-CM

## 2018-04-30 DIAGNOSIS — F41.8: ICD-10-CM

## 2018-04-30 DIAGNOSIS — E03.9: Primary | ICD-10-CM

## 2018-04-30 DIAGNOSIS — E78.5: ICD-10-CM

## 2018-04-30 LAB
ALBUMIN SERPL-MCNC: 4.1 GM/DL (ref 3.4–5)
ALP SERPL-CCNC: 110 U/L (ref 45–117)
ALT SERPL-CCNC: 45 U/L (ref 12–78)
AST SERPL-CCNC: 32 U/L (ref 15–37)
BUN SERPL-MCNC: 9 MG/DL (ref 7–18)
CALCIUM SERPL-MCNC: 8.4 MG/DL (ref 8.5–10.1)
CO2 SERPL-SCNC: 35 MMOL/L (ref 21–32)
CREAT SERPL-MCNC: 0.79 MG/DL (ref 0.6–1.2)
GLUCOSE SERPL-MCNC: 42 MG/DL (ref 70–99)
KETONES UR QL STRIP: 60 MG/DL
PH UR: 163 MG/DL (ref 0–200)
POTASSIUM SERPL-SCNC: 3.3 MMOL/L (ref 3.5–5.1)
PROT SERPL-MCNC: 6.6 GM/DL (ref 6.4–8.2)
SODIUM SERPL-SCNC: 140 MMOL/L (ref 136–145)

## 2018-05-01 LAB — HBA1C MFR BLD: 7.1 % (ref 4.5–5.6)

## 2018-05-07 ENCOUNTER — HOSPITAL ENCOUNTER (OUTPATIENT)
Dept: HOSPITAL 45 - C.LABMFLN | Age: 63
Discharge: HOME | End: 2018-05-07
Attending: FAMILY MEDICINE
Payer: COMMERCIAL

## 2018-05-07 DIAGNOSIS — F31.9: Primary | ICD-10-CM

## 2018-05-22 ENCOUNTER — HOSPITAL ENCOUNTER (OUTPATIENT)
Dept: HOSPITAL 45 - C.LABMFLN | Age: 63
Discharge: HOME | End: 2018-05-22
Attending: FAMILY MEDICINE
Payer: COMMERCIAL

## 2018-05-22 DIAGNOSIS — E87.6: Primary | ICD-10-CM

## 2018-07-30 ENCOUNTER — HOSPITAL ENCOUNTER (OUTPATIENT)
Dept: HOSPITAL 45 - C.LABMFLN | Age: 63
Discharge: HOME | End: 2018-07-30
Attending: FAMILY MEDICINE
Payer: COMMERCIAL

## 2018-07-30 DIAGNOSIS — E11.42: Primary | ICD-10-CM

## 2018-07-30 LAB
BUN SERPL-MCNC: 10 MG/DL (ref 7–18)
CALCIUM SERPL-MCNC: 8.8 MG/DL (ref 8.5–10.1)
CO2 SERPL-SCNC: 35 MMOL/L (ref 21–32)
CREAT SERPL-MCNC: 0.8 MG/DL (ref 0.6–1.2)
GLUCOSE SERPL-MCNC: 85 MG/DL (ref 70–99)
POTASSIUM SERPL-SCNC: 4 MMOL/L (ref 3.5–5.1)
SODIUM SERPL-SCNC: 136 MMOL/L (ref 136–145)

## 2018-07-31 LAB — HBA1C MFR BLD: 7.1 % (ref 4.5–5.6)
